# Patient Record
Sex: FEMALE | Race: AMERICAN INDIAN OR ALASKA NATIVE
[De-identification: names, ages, dates, MRNs, and addresses within clinical notes are randomized per-mention and may not be internally consistent; named-entity substitution may affect disease eponyms.]

---

## 2020-10-09 NOTE — HISTORY AND PHYSICAL REPORT
History of Present Illness


Date of examination: 10/09/20


Date of admission: 


10/16/2020


Chief complaint: 





LEEP for moderate dysplasia


History of present illness: 





26 yo  with known LSIL pap with subsequent ULICES-2/HSIL endo/ectocervical 

biopsies presenting for definitive surgical management via Loop electrosurgical 

excision procedure.





Past History


Past Surgical History: other (back surgery, elective )


GYN History: herpes


Family/Genetic History: none


Social history: no significant social history





- Obstetrical History


: 3


Hx # Term Pregnancies: 1


Spontaneous Abortions: 2


Number of Living Children: 1





Medications and Allergies


                                    Allergies











Allergy/AdvReac Type Severity Reaction Status Date / Time


 


strawberry AdvReac Intermediate Rash Verified 10/09/20 13:25











                                Home Medications











 Medication  Instructions  Recorded  Confirmed  Last Taken  Type


 


No Known Home Medications [No  10/09/20 10/09/20 Unknown History





Reported Home Medications]     














Review of Systems


All systems: negative (expect HPI)





- Physical Exam


Abdomen: Positive: normal appearance, normal bowel sounds





Results


All other labs normal.








Assessment and Plan





- Patient Problems


(1) ULICES II (cervical intraepithelial neoplasia II)


Current Visit: No   Status: Acute   


Plan to address problem: 


To OR for loop electrosurgical excision procedure.  Questions solicited and 

answered.  Consented in the chart.

## 2020-10-16 ENCOUNTER — HOSPITAL ENCOUNTER (OUTPATIENT)
Dept: HOSPITAL 5 - OR | Age: 25
Discharge: HOME | End: 2020-10-16
Attending: OBSTETRICS & GYNECOLOGY
Payer: COMMERCIAL

## 2020-10-16 VITALS — DIASTOLIC BLOOD PRESSURE: 75 MMHG | SYSTOLIC BLOOD PRESSURE: 113 MMHG

## 2020-10-16 DIAGNOSIS — Z98.890: ICD-10-CM

## 2020-10-16 DIAGNOSIS — Z87.442: ICD-10-CM

## 2020-10-16 DIAGNOSIS — Z87.440: ICD-10-CM

## 2020-10-16 DIAGNOSIS — K21.9: ICD-10-CM

## 2020-10-16 DIAGNOSIS — N87.1: Primary | ICD-10-CM

## 2020-10-16 DIAGNOSIS — E66.9: ICD-10-CM

## 2020-10-16 LAB
HCT VFR BLD CALC: 41.1 % (ref 30.3–42.9)
HGB BLD-MCNC: 14.7 GM/DL (ref 10.1–14.3)
MCHC RBC AUTO-ENTMCNC: 36 % (ref 30–34)
MCV RBC AUTO: 92 FL (ref 79–97)
PLATELET # BLD: 314 K/MM3 (ref 140–440)
RBC # BLD AUTO: 4.45 M/MM3 (ref 3.65–5.03)

## 2020-10-16 PROCEDURE — 57522 CONIZATION OF CERVIX: CPT

## 2020-10-16 PROCEDURE — 85027 COMPLETE CBC AUTOMATED: CPT

## 2020-10-16 PROCEDURE — 36415 COLL VENOUS BLD VENIPUNCTURE: CPT

## 2020-10-16 PROCEDURE — 88307 TISSUE EXAM BY PATHOLOGIST: CPT

## 2020-10-16 PROCEDURE — 81025 URINE PREGNANCY TEST: CPT

## 2020-10-16 NOTE — ANESTHESIA CONSULTATION
Anesthesia Consult and Med Hx


Date of service: 10/16/20





- Airway


Anesthetic Teeth Evaluation: Good


ROM Head & Neck: Adequate


Mental/Hyoid Distance: Adequate


Mallampati Class: Class I


Intubation Access Assessment: Good





- Pulmonary Exam


CTA: Yes





- Cardiac Exam


Cardiac Exam: RRR





- Pre-Operative Health Status


ASA Pre-Surgery Classification: ASA1


Proposed Anesthetic Plan: General





- Pulmonary


Hx Smoking: No


Hx Respiratory Symptoms: No





- Cardiovascular System


Hx Hypertension: No





- Central Nervous System


CVA: No





- Gastrointestinal


Hx Gastroesophageal Reflux Disease: No





- Endocrine


Hx Renal Disease: No


Hx Liver Disease: No


Hx Insulin Dependent Diabetes: No


Hx Non-Insulin Dependent Diabetes: No


Hx Thyroid Disease: No





- Other Systems


Hx Obesity: Yes (BMI 32)





- Additional Comments


Anesthesia Medical History Comments: No hx anesthetic complications.

## 2020-10-16 NOTE — PROCEDURE NOTE
Date of procedure: 10/16/20


Pre-op diagnosis: ULICES 2/moderate cervical dysplasia


Post-op diagnosis: same


Procedure: 





Preoperative diagnosis: Biopsy-proven cervical intraepithelial neoplasia (ULICES) 2


Postop postoperative diagnosis: Same





Operation performed:


1.  Exam under anesthesia


2.  Loop electrosurgical excision procedure





Surgeon: Christin Langley MD


Estimated blood loss  25cc


Intraoperative IV fluids not recorded


Urine output 25cc





Pathology specimens:


1.  Loop left surgical excision procedure (LEEP) biopsy


2.  LEEP Top-Hat





Complications: none


Disposition and condition: To the PACU in stable condition then discharged home





Findings:


1.  Small anteverted mobile uterus without any adnexal masses on EUA 


2. Grossly normal-appearing cervix





Statement medical necessity: This is a 25-year-old G 3 P 1021 patient, with a 

history of LSIL pap smear followed by suspected satisfactory colposcopy and 

biopsy proven CIN2.  The procedural risk, benefits, indications and alternatives

were thoroughly reviewed patient and she desired to proceed with surgical 

management.


Description of operation: After obtaining informed consent the patient was taken

to the operating room where satisfactory general endotracheal anesthesia was 

established.  The patient was placed in the standard lithotomy position using 

candycane stirrups, ensuring proper positioning and cushioning to avoid injury. 

Exam under anesthesia was performed with the findings noted above.  Straight 

catheterization of the bladder was performed.  Patient was prepped and draped in

the usual sterile fashion.  Coated sidewall retractors were inserted into the 

anterior and posterior vaginal fornix to assist with visualization of the 

cervix.  The cervix and upper vagina were coated with the Lugol's iodine and 

inspected for nonstaining areas.  The entire transformation zone was visualized 

and there was a small circumferential nonstaining area around the cervical os.


A LEEP was completed with a 20x12 mm loop electrode.  A LEEP Top-Hat was 

performed with a 10x10 mm loop electrode.  All specimens were sent to pathology.

 The biopsy site was rendered hemostatic with Bovie electrocautery with a 5 mm 

electrosurgical ball electrode and Monsel's solution was applied to the 

excisional base.  The retractors were removed. The patient was returned to 

dorsal supine position.  The patient tolerated procedure well, was extubated 

without difficulty and transferred to recovery in good condition.  Sponge, 

needle and instrument counts are correct x2.  There is no surgical or anesthetic

complications.


Anesthesia: GETA


Surgeon: CHRISTIN LANGLEY JR


Estimated blood loss: minimal


Urine output: 25


Pathology: list (1. LEEP, 2. top hat)


Specimen disposition: to lab


Condition: stable


Disposition: same day